# Patient Record
Sex: FEMALE | Race: WHITE | Employment: OTHER | URBAN - NONMETROPOLITAN AREA
[De-identification: names, ages, dates, MRNs, and addresses within clinical notes are randomized per-mention and may not be internally consistent; named-entity substitution may affect disease eponyms.]

---

## 2020-12-29 ENCOUNTER — OFFICE VISIT (OUTPATIENT)
Dept: FAMILY MEDICINE CLINIC | Age: 65
End: 2020-12-29
Payer: MEDICARE

## 2020-12-29 ENCOUNTER — TRANSCRIBE ORDER (OUTPATIENT)
Dept: SCHEDULING | Age: 65
End: 2020-12-29

## 2020-12-29 VITALS
BODY MASS INDEX: 23.19 KG/M2 | HEART RATE: 91 BPM | SYSTOLIC BLOOD PRESSURE: 117 MMHG | DIASTOLIC BLOOD PRESSURE: 75 MMHG | HEIGHT: 62 IN | OXYGEN SATURATION: 100 % | WEIGHT: 126 LBS | TEMPERATURE: 96.7 F

## 2020-12-29 DIAGNOSIS — Z00.00 PHYSICAL EXAM: Primary | ICD-10-CM

## 2020-12-29 DIAGNOSIS — Z12.31 VISIT FOR SCREENING MAMMOGRAM: Primary | ICD-10-CM

## 2020-12-29 DIAGNOSIS — N63.0 BREAST NODULE: ICD-10-CM

## 2020-12-29 DIAGNOSIS — R05.8 COUGH PRESENT FOR GREATER THAN 3 WEEKS: ICD-10-CM

## 2020-12-29 DIAGNOSIS — Z00.00 PHYSICAL EXAM: ICD-10-CM

## 2020-12-29 DIAGNOSIS — M81.0 AGE-RELATED OSTEOPOROSIS WITHOUT CURRENT PATHOLOGICAL FRACTURE: ICD-10-CM

## 2020-12-29 DIAGNOSIS — M79.7 FIBROMYALGIA: ICD-10-CM

## 2020-12-29 DIAGNOSIS — Z13.1 SCREENING FOR DIABETES MELLITUS (DM): ICD-10-CM

## 2020-12-29 DIAGNOSIS — Z77.22 SECOND HAND TOBACCO SMOKE EXPOSURE: ICD-10-CM

## 2020-12-29 PROCEDURE — 99213 OFFICE O/P EST LOW 20 MIN: CPT | Performed by: INTERNAL MEDICINE

## 2020-12-29 NOTE — PROGRESS NOTES
Yves Gant presents today for   Chief Complaint   Patient presents with   [address]       Is someone accompanying this pt? no    Is the patient using any DME equipment during OV? no    Depression Screening:  3 most recent PHQ Screens 12/29/2020   Little interest or pleasure in doing things Not at all   Feeling down, depressed, irritable, or hopeless Not at all   Total Score PHQ 2 0       Learning Assessment:  Learning Assessment 12/29/2020   PRIMARY LEARNER Patient   PRIMARY LANGUAGE ENGLISH   LEARNER PREFERENCE PRIMARY DEMONSTRATION   ANSWERED BY patient   RELATIONSHIP SELF       Abuse Screening:  No flowsheet data found. Fall Risk  No flowsheet data found. ADL  No flowsheet data found. Health Maintenance reviewed and discussed and ordered per Provider. Health Maintenance Due   Topic Date Due    Hepatitis C Screening  1955    DTaP/Tdap/Td series (1 - Tdap) 08/27/1978    PAP AKA CERVICAL CYTOLOGY  08/27/1978    Lipid Screen  08/27/1997    Shingrix Vaccine Age 50> (1 of 2) 08/27/2007    Colorectal Cancer Screening Combo  08/27/2007    Breast Cancer Screen Mammogram  08/27/2007    Flu Vaccine (1) 09/01/2020   . Coordination of Care:  1. Have you been to the ER, urgent care clinic since your last visit? Hospitalized since your last visit? no    2. Have you seen or consulted any other health care providers outside of the [address] [place] [address] since your last visit? Include any pap smears or colon screening.  no    Providers orders his own labs, orders for colonoscopy, mammograms and referrals as needed    Last UDS Checked no  Last Pain contract signed: no

## 2020-12-29 NOTE — PATIENT INSTRUCTIONS
Medicare Wellness Visit, Female The best way to live healthy is to have a lifestyle where you eat a well-balanced diet, exercise regularly, limit alcohol use, and quit all forms of tobacco/nicotine, if applicable. Regular preventive services are another way to keep healthy. Preventive services (vaccines, screening tests, monitoring & exams) can help personalize your care plan, which helps you manage your own care. Screening tests can find health problems at the earliest stages, when they are easiest to treat. [facility] follows the current, evidence-based guidelines published by the [facility] ([facility]) when recommending preventive services for our patients. Because we follow these guidelines, sometimes recommendations change over time as research supports it. (For example, mammograms used to be recommended annually. Even though Medicare will still pay for an annual mammogram, the newer guidelines recommend a mammogram every two years for women of average risk). Of course, you and your doctor may decide to screen more often for some diseases, based on your risk and your co-morbidities (chronic disease you are already diagnosed with). Preventive services for you include: - Medicare offers their members a free annual wellness visit, which is time for you and your primary care provider to discuss and plan for your preventive service needs. Take advantage of this benefit every year! 
-All adults over the age of 72 should receive the recommended pneumonia vaccines. Current USPSTF guidelines recommend a series of two vaccines for the best pneumonia protection.  
-All adults should have a flu vaccine yearly and a tetanus vaccine every 10 years.  
-All adults age 48 and older should receive the shingles vaccines (series of two vaccines). -All adults age 38-68 who are overweight should have a diabetes screening test once every three years. -All adults born between [birth year] and 1963 should be screened once for Hepatitis C. 
-Other screening tests and preventive services for persons with diabetes include: an eye exam to screen for diabetic retinopathy, a kidney function test, a foot exam, and stricter control over your cholesterol.  
-Cardiovascular screening for adults with routine risk involves an electrocardiogram (ECG) at intervals determined by your doctor.  
-Colorectal cancer screenings should be done for adults age 54-65 with no increased risk factors for colorectal cancer. There are a number of acceptable methods of screening for this type of cancer. Each test has its own benefits and drawbacks. Discuss with your doctor what is most appropriate for you during your annual wellness visit. The different tests include: colonoscopy (considered the best screening method), a fecal occult blood test, a fecal DNA test, and sigmoidoscopy. 
 
-A bone mass density test is recommended when a woman turns 65 to screen for osteoporosis. This test is only recommended one time, as a screening. Some providers will use this same test as a disease monitoring tool if you already have osteoporosis. -Breast cancer screenings are recommended every other year for women of normal risk, age 54-69. 
-Cervical cancer screenings for women over age 72 are only recommended with certain risk factors. Here is a list of your current Health Maintenance items (your personalized list of preventive services) with a due date: 
Health Maintenance Due Topic Date Due  
 Hepatitis C Test  1955  
 DTaP/Tdap/Td  (1 - Tdap) 08/27/1978  Pap Test  08/27/1978  Cholesterol Test   08/27/1997  Shingles Vaccine (1 of 2) 08/27/2007  Colorectal Screening  08/27/2007  Mammogram  08/27/2007  Yearly Flu Vaccine (1) 09/01/2020

## 2020-12-29 NOTE — PROGRESS NOTES
YOSEF Pack  is a 63 y.o. female and presents today for Establish Care  Doing well. Needs physical. Scheduled mammogram. Had  hysterectomy    Allergies    Allergies   Allergen Reactions    Amitriptyline Unknown (comments)    Gabapentin Vertigo    Penicillins Unknown (comments)        Medications    No current outpatient medications on file. No current facility-administered medications for this visit. Health Maintenance    Health Maintenance Due   Topic Date Due    Hepatitis C Screening  1955    DTaP/Tdap/Td series (1 - Tdap) 08/27/1978    PAP AKA CERVICAL CYTOLOGY  08/27/1978    Lipid Screen  08/27/1997    Shingrix Vaccine Age 50> (1 of 2) 08/27/2007    Colorectal Cancer Screening Combo  08/27/2007    Breast Cancer Screen Mammogram  08/27/2007    Flu Vaccine (1) 09/01/2020        Problem List    Patient Active Problem List    Diagnosis Date Noted    Physical exam 12/29/2020    Fibromyalgia 12/29/2020        Family Hx    Family History   Problem Relation Age of Onset   Grimm Cancer Mother     No Known Problems Father         Social Hx    Social History     Socioeconomic History    Marital status:      Spouse name: Not on file    Number of children: Not on file    Years of education: Not on file    Highest education level: Not on file   Tobacco Use    Smoking status: Never Smoker    Smokeless tobacco: Never Used   Substance and Sexual Activity    Alcohol use: Never     Frequency: Never    Drug use: Never        Surgical Hx    Past Surgical History:   Procedure Laterality Date    HX GYN      hysterectomy   in her 31's          Vitals    Visit Vitals  /75 (BP 1 Location: Left arm, BP Patient Position: Sitting)   Pulse 91   Temp (!) 96.7 °F (35.9 °C)   Ht 5' 2\" (1.575 m)   Wt 126 lb (57.2 kg)   SpO2 100%   BMI 23.05 kg/m²        ROS    Review of Systems   Constitutional: Negative. HENT: Negative. Eyes: Negative. Respiratory: Negative.     Cardiovascular: Negative. Gastrointestinal: Negative. Genitourinary: Negative. Musculoskeletal: Negative. Skin: Negative. Neurological: Negative. Endo/Heme/Allergies: Negative. Physical Exam    Physical Exam  Vitals signs and nursing note reviewed. Constitutional:       Appearance: Normal appearance. HENT:      Head: Normocephalic. Right Ear: Tympanic membrane normal.   Eyes:      Extraocular Movements: Extraocular movements intact. Pupils: Pupils are equal, round, and reactive to light. Neck:      Musculoskeletal: Normal range of motion and neck supple. Cardiovascular:      Rate and Rhythm: Normal rate. Pulses: Normal pulses. Pulmonary:      Effort: Pulmonary effort is normal.      Breath sounds: Normal breath sounds. Abdominal:      General: Abdomen is flat. Palpations: Abdomen is soft. Musculoskeletal: Normal range of motion. Neurological:      General: No focal deficit present. Mental Status: She is alert and oriented to person, place, and time. Psychiatric:         Mood and Affect: Mood normal.          Assessment/Plan    Diagnoses and all orders for this visit:    1. Physical exam    2. Fibromyalgia         Health Maintenance Items reviewed with patient as noted.

## 2020-12-30 ENCOUNTER — APPOINTMENT (OUTPATIENT)
Dept: CT IMAGING | Age: 65
End: 2020-12-30
Attending: INTERNAL MEDICINE
Payer: MEDICARE

## 2020-12-30 ENCOUNTER — HOSPITAL ENCOUNTER (OUTPATIENT)
Dept: LAB | Age: 65
Discharge: HOME | End: 2020-12-30
Admitting: INTERNAL MEDICINE
Payer: MEDICARE

## 2020-12-30 LAB
ALBUMIN SERPL-MCNC: 4 G/DL (ref 3.5–4.7)
ALBUMIN/GLOB SERPL: 1.4 {RATIO}
ALP SERPL-CCNC: 80 U/L (ref 38–126)
ALT SERPL-CCNC: 24 U/L (ref 3–52)
ANION GAP SERPL CALC-SCNC: 10 MMOL/L
APPEARANCE UR: CLEAR
AST SERPL W P-5'-P-CCNC: 22 U/L (ref 14–74)
BACTERIA URNS QL MICRO: ABNORMAL /HPF
BASOPHILS # BLD: 0.1 K/UL
BASOPHILS NFR BLD: 1 %
BILIRUB SERPL-MCNC: 0.8 MG/DL (ref 0.2–1)
BILIRUB UR QL: NEGATIVE
BUN SERPL-MCNC: 21 MG/DL (ref 9–21)
BUN/CREAT SERPL: 35
CA-I BLD-MCNC: 9.2 MG/DL (ref 8.5–10.5)
CHLORIDE SERPL-SCNC: 104 MMOL/L (ref 94–111)
CO2 SERPL-SCNC: 27 MMOL/L (ref 21–33)
COLOR UR: ABNORMAL
CREAT SERPL-MCNC: 0.6 MG/DL (ref 0.7–1.2)
DIFFERENTIAL METHOD BLD: ABNORMAL
EOSINOPHIL # BLD: 0.1 K/UL
EOSINOPHIL NFR BLD: 2 %
EPITH CASTS URNS QL MICRO: ABNORMAL /LPF (ref 0–20)
ERYTHROCYTE [DISTWIDTH] IN BLOOD BY AUTOMATED COUNT: 15 % (ref 11.6–14.5)
GLOBULIN SER CALC-MCNC: 2.9 G/DL
GLUCOSE SERPL-MCNC: 85 MG/DL (ref 70–110)
GLUCOSE UR STRIP.AUTO-MCNC: NEGATIVE MG/DL
HCT VFR BLD AUTO: 40.6 % (ref 35–45)
HGB BLD-MCNC: 13 G/DL (ref 12–16)
HGB UR QL STRIP: ABNORMAL
IMM GRANULOCYTES # BLD AUTO: 0 K/UL
IMM GRANULOCYTES NFR BLD AUTO: 0 %
KETONES UR QL STRIP.AUTO: NEGATIVE MG/DL
LEUKOCYTE ESTERASE UR QL STRIP.AUTO: NEGATIVE
LYMPHOCYTES # BLD: 1.8 K/UL
LYMPHOCYTES NFR BLD: 24 %
MCH RBC QN AUTO: 29.7 PG (ref 24–34)
MCHC RBC AUTO-ENTMCNC: 32.1 G/DL (ref 31–37)
MCV RBC AUTO: 92.4 FL (ref 74–97)
MONOCYTES # BLD: 0.5 K/UL
MONOCYTES NFR BLD: 7 %
MUCOUS THREADS URNS QL MICRO: ABNORMAL /LPF
NEUTS SEG # BLD: 4.8 K/UL
NEUTS SEG NFR BLD: 66 %
NITRITE UR QL STRIP.AUTO: NEGATIVE
PH UR STRIP: 6.5 [PH] (ref 5–9)
PLATELET # BLD AUTO: 432 K/UL (ref 135–420)
PMV BLD AUTO: 8 FL (ref 9.2–11.8)
POTASSIUM SERPL-SCNC: 3.9 MMOL/L (ref 3.2–5.1)
PROT SERPL-MCNC: 6.9 G/DL (ref 6.1–8.4)
PROT UR STRIP-MCNC: NEGATIVE MG/DL
RBC # BLD AUTO: 4.39 M/UL (ref 4.2–5.3)
RBC #/AREA URNS HPF: ABNORMAL /HPF (ref 0–2)
RBC MORPH BLD: ABNORMAL
SODIUM SERPL-SCNC: 141 MMOL/L (ref 135–145)
SP GR UR REFRACTOMETRY: 1.02 (ref 1–1.03)
UROBILINOGEN UR QL STRIP.AUTO: 0.2 EU/DL (ref 0.2–1)
WBC # BLD AUTO: 7.3 K/UL (ref 4.6–13.2)
WBC URNS QL MICRO: ABNORMAL /HPF (ref 0–4)

## 2020-12-30 PROCEDURE — 82043 UR ALBUMIN QUANTITATIVE: CPT

## 2020-12-30 PROCEDURE — 80053 COMPREHEN METABOLIC PANEL: CPT

## 2020-12-30 PROCEDURE — 36415 COLL VENOUS BLD VENIPUNCTURE: CPT

## 2020-12-30 PROCEDURE — 81001 URINALYSIS AUTO W/SCOPE: CPT

## 2020-12-30 PROCEDURE — 85025 COMPLETE CBC W/AUTO DIFF WBC: CPT

## 2020-12-31 ENCOUNTER — HOSPITAL ENCOUNTER (OUTPATIENT)
Dept: CT IMAGING | Age: 65
Discharge: HOME | End: 2020-12-31
Attending: INTERNAL MEDICINE
Payer: MEDICARE

## 2020-12-31 DIAGNOSIS — R05.8 COUGH PRESENT FOR GREATER THAN 3 WEEKS: ICD-10-CM

## 2020-12-31 DIAGNOSIS — Z77.22 SECOND HAND TOBACCO SMOKE EXPOSURE: ICD-10-CM

## 2020-12-31 LAB
CREAT UR-MCNC: 131 MG/DL
MICROALBUMIN UR-MCNC: 0.77 MG/DL
MICROALBUMIN/CREAT UR-RTO: 6 MGMALB/GCRE (ref 0–30)

## 2020-12-31 PROCEDURE — 71250 CT THORAX DX C-: CPT

## 2021-01-04 PROBLEM — N63.0 BREAST NODULE: Status: ACTIVE | Noted: 2021-01-04

## 2021-01-07 ENCOUNTER — HOSPITAL ENCOUNTER (OUTPATIENT)
Dept: MAMMOGRAPHY | Age: 66
Discharge: HOME | End: 2021-01-07
Attending: INTERNAL MEDICINE
Payer: MEDICARE

## 2021-01-07 ENCOUNTER — TELEPHONE (OUTPATIENT)
Dept: FAMILY MEDICINE CLINIC | Age: 66
End: 2021-01-07

## 2021-01-07 DIAGNOSIS — Z00.00 PHYSICAL EXAM: ICD-10-CM

## 2021-01-07 DIAGNOSIS — N63.0 BREAST NODULE: Primary | ICD-10-CM

## 2021-01-07 DIAGNOSIS — M81.0 AGE-RELATED OSTEOPOROSIS WITHOUT CURRENT PATHOLOGICAL FRACTURE: ICD-10-CM

## 2021-01-07 DIAGNOSIS — Z12.31 VISIT FOR SCREENING MAMMOGRAM: ICD-10-CM

## 2021-01-07 DIAGNOSIS — R93.89 NODULAR RADIOLOGIC DENSITY: ICD-10-CM

## 2021-01-07 PROCEDURE — 77066 DX MAMMO INCL CAD BI: CPT

## 2021-01-07 PROCEDURE — 77080 DXA BONE DENSITY AXIAL: CPT

## 2021-01-07 NOTE — TELEPHONE ENCOUNTER
Pt got a call from someone in the office about needing new diagnostic mammogram order and was calling to get more info about it.  Please give her a call at [phone]

## 2021-01-28 PROBLEM — Z00.00 PHYSICAL EXAM: Status: RESOLVED | Noted: 2020-12-29 | Resolved: 2021-01-28

## 2021-01-28 PROBLEM — Z13.1 SCREENING FOR DIABETES MELLITUS (DM): Status: RESOLVED | Noted: 2020-12-29 | Resolved: 2021-01-28

## 2021-03-16 ENCOUNTER — TELEPHONE (OUTPATIENT)
Dept: INTERNAL MEDICINE CLINIC | Age: 66
End: 2021-03-16

## 2022-01-09 ENCOUNTER — APPOINTMENT (OUTPATIENT)
Dept: CT IMAGING | Age: 67
End: 2022-01-09
Attending: EMERGENCY MEDICINE
Payer: MEDICARE

## 2022-01-09 ENCOUNTER — APPOINTMENT (OUTPATIENT)
Dept: GENERAL RADIOLOGY | Age: 67
End: 2022-01-09
Attending: EMERGENCY MEDICINE
Payer: MEDICARE

## 2022-01-09 ENCOUNTER — HOSPITAL ENCOUNTER (EMERGENCY)
Age: 67
Discharge: HOME | End: 2022-01-09
Attending: EMERGENCY MEDICINE
Payer: MEDICARE

## 2022-01-09 DIAGNOSIS — F43.21 GRIEF: ICD-10-CM

## 2022-01-09 DIAGNOSIS — R07.89 ATYPICAL CHEST PAIN: Primary | ICD-10-CM

## 2022-01-09 DIAGNOSIS — M25.512 LEFT SHOULDER PAIN, UNSPECIFIED CHRONICITY: ICD-10-CM

## 2022-01-09 LAB
ALBUMIN SERPL-MCNC: 4.4 G/DL (ref 3.5–4.7)
ALBUMIN/GLOB SERPL: 1.4 {RATIO}
ALP SERPL-CCNC: 73 U/L (ref 38–126)
ALT SERPL-CCNC: 17 U/L (ref 3–52)
ANION GAP SERPL CALC-SCNC: 13 MMOL/L
AST SERPL W P-5'-P-CCNC: 18 U/L (ref 14–74)
BASOPHILS # BLD: 0 K/UL (ref 0–0.1)
BASOPHILS NFR BLD: 0 % (ref 0–2)
BILIRUB SERPL-MCNC: 0.9 MG/DL (ref 0.2–1)
BUN SERPL-MCNC: 12 MG/DL (ref 9–21)
BUN/CREAT SERPL: 17
CA-I BLD-MCNC: 9.7 MG/DL (ref 8.5–10.5)
CHLORIDE SERPL-SCNC: 101 MMOL/L (ref 94–111)
CK MB SERPL-MCNC: 3 NG/ML (ref 5–25)
CK SERPL-CCNC: 63 U/L (ref 26–140)
CO2 SERPL-SCNC: 26 MMOL/L (ref 21–33)
CREAT SERPL-MCNC: 0.7 MG/DL (ref 0.7–1.2)
D DIMER PPP FEU-MCNC: <0.27 UG/ML(FEU)
DIFFERENTIAL METHOD BLD: ABNORMAL
EOSINOPHIL # BLD: 0.1 K/UL (ref 0–0.4)
EOSINOPHIL NFR BLD: 1 % (ref 0–5)
ERYTHROCYTE [DISTWIDTH] IN BLOOD BY AUTOMATED COUNT: 14.2 % (ref 11.6–14.5)
GLOBULIN SER CALC-MCNC: 3.1 G/DL
GLUCOSE SERPL-MCNC: 95 MG/DL (ref 70–110)
HCT VFR BLD AUTO: 41.6 % (ref 35–45)
HGB BLD-MCNC: 13.4 G/DL (ref 12–16)
IMM GRANULOCYTES # BLD AUTO: 0.1 K/UL (ref 0–0.04)
IMM GRANULOCYTES NFR BLD AUTO: 0 % (ref 0–0.5)
LYMPHOCYTES # BLD: 2.3 K/UL (ref 0.9–3.6)
LYMPHOCYTES NFR BLD: 18 % (ref 21–52)
MCH RBC QN AUTO: 29.8 PG (ref 24–34)
MCHC RBC AUTO-ENTMCNC: 32.2 G/DL (ref 31–37)
MCV RBC AUTO: 92.4 FL (ref 78–100)
MONOCYTES # BLD: 0.6 K/UL (ref 0.05–1.2)
MONOCYTES NFR BLD: 5 % (ref 3–10)
NEUTS SEG # BLD: 9.5 K/UL (ref 1.8–8)
NEUTS SEG NFR BLD: 76 % (ref 40–73)
NRBC # BLD: 0 K/UL (ref 0–0.01)
NRBC BLD-RTO: 0 PER 100 WBC
PLATELET # BLD AUTO: 428 K/UL (ref 135–420)
PMV BLD AUTO: 10 FL (ref 9.2–11.8)
POTASSIUM SERPL-SCNC: 3.4 MMOL/L (ref 3.2–5.1)
PROT SERPL-MCNC: 7.5 G/DL (ref 6.1–8.4)
RBC # BLD AUTO: 4.5 M/UL (ref 4.2–5.3)
SODIUM SERPL-SCNC: 140 MMOL/L (ref 135–145)
TROPONIN I SERPL-MCNC: <0.02 NG/ML (ref 0.02–0.05)
TROPONIN I SERPL-MCNC: <0.02 NG/ML (ref 0.02–0.05)
WBC # BLD AUTO: 12.6 K/UL (ref 4.6–13.2)

## 2022-01-09 PROCEDURE — 70450 CT HEAD/BRAIN W/O DYE: CPT

## 2022-01-09 PROCEDURE — 84484 ASSAY OF TROPONIN QUANT: CPT

## 2022-01-09 PROCEDURE — 74011250636 HC RX REV CODE- 250/636: Performed by: EMERGENCY MEDICINE

## 2022-01-09 PROCEDURE — 71045 X-RAY EXAM CHEST 1 VIEW: CPT

## 2022-01-09 PROCEDURE — 99284 EMERGENCY DEPT VISIT MOD MDM: CPT

## 2022-01-09 PROCEDURE — 82550 ASSAY OF CK (CPK): CPT

## 2022-01-09 PROCEDURE — 80053 COMPREHEN METABOLIC PANEL: CPT

## 2022-01-09 PROCEDURE — 72125 CT NECK SPINE W/O DYE: CPT

## 2022-01-09 PROCEDURE — 85379 FIBRIN DEGRADATION QUANT: CPT

## 2022-01-09 PROCEDURE — 96374 THER/PROPH/DIAG INJ IV PUSH: CPT

## 2022-01-09 PROCEDURE — 82553 CREATINE MB FRACTION: CPT

## 2022-01-09 PROCEDURE — 85025 COMPLETE CBC W/AUTO DIFF WBC: CPT

## 2022-01-09 PROCEDURE — 96375 TX/PRO/DX INJ NEW DRUG ADDON: CPT

## 2022-01-09 PROCEDURE — 93005 ELECTROCARDIOGRAM TRACING: CPT

## 2022-01-09 RX ORDER — KETOROLAC TROMETHAMINE 30 MG/ML
30 INJECTION, SOLUTION INTRAMUSCULAR; INTRAVENOUS
Status: COMPLETED | OUTPATIENT
Start: 2022-01-09 | End: 2022-01-09

## 2022-01-09 RX ORDER — LORAZEPAM 2 MG/ML
0.5 INJECTION INTRAMUSCULAR
Status: DISCONTINUED | OUTPATIENT
Start: 2022-01-09 | End: 2022-01-09

## 2022-01-09 RX ORDER — ONDANSETRON 2 MG/ML
4 INJECTION INTRAMUSCULAR; INTRAVENOUS
Status: COMPLETED | OUTPATIENT
Start: 2022-01-09 | End: 2022-01-09

## 2022-01-09 RX ORDER — ALPRAZOLAM 0.25 MG/1
0.25 TABLET ORAL
Qty: 8 TABLET | Refills: 0 | Status: SHIPPED | OUTPATIENT
Start: 2022-01-09

## 2022-01-09 RX ORDER — KETOROLAC TROMETHAMINE 10 MG/1
10 TABLET, FILM COATED ORAL
Qty: 15 TABLET | Refills: 0 | Status: SHIPPED | OUTPATIENT
Start: 2022-01-09

## 2022-01-09 RX ADMIN — ONDANSETRON 4 MG: 2 INJECTION INTRAMUSCULAR; INTRAVENOUS at 20:42

## 2022-01-09 RX ADMIN — KETOROLAC TROMETHAMINE 30 MG: 30 INJECTION, SOLUTION INTRAMUSCULAR at 20:44

## 2022-01-10 VITALS
HEIGHT: 62 IN | SYSTOLIC BLOOD PRESSURE: 150 MMHG | TEMPERATURE: 97.9 F | BODY MASS INDEX: 22.82 KG/M2 | RESPIRATION RATE: 18 BRPM | OXYGEN SATURATION: 98 % | DIASTOLIC BLOOD PRESSURE: 83 MMHG | WEIGHT: 124 LBS | HEART RATE: 66 BPM

## 2022-01-10 LAB
ATRIAL RATE: 75 BPM
CALCULATED P AXIS, ECG09: 88 DEGREES
CALCULATED R AXIS, ECG10: 10 DEGREES
CALCULATED T AXIS, ECG11: 57 DEGREES
DIAGNOSIS, 93000: NORMAL
P-R INTERVAL, ECG05: 191 MS
Q-T INTERVAL, ECG07: 380 MS
QRS DURATION, ECG06: 74 MS
QTC CALCULATION (BEZET), ECG08: 428 MS
VENTRICULAR RATE, ECG03: 76 BPM

## 2022-01-10 NOTE — ED NOTES
In to medicate patient as ordered, declined Ativan. Reports states it has opposite effect on her. Dr Krystal Maza aware.

## 2022-01-10 NOTE — ED NOTES
To treatment area with pain in chest and left arm and shoulder, states left arm feels heavy. Describes as pressure/heaviness in chest. Started approximately 2 hours PTA. Admits to being under a lot of stress recently due to illness and death of father.

## 2022-01-10 NOTE — ED PROVIDER NOTES
EMERGENCY DEPARTMENT HISTORY AND PHYSICAL EXAM      Date: 2022  Patient Name: Lalita Morris      History of Presenting Illness     Chief Complaint   Patient presents with    Chest Pain       History Provided By: Patient    HPI: Lalita Morris, 61 y.o. female with  No significant past medical history presents to the ED with cc of pain. Patient states about an hour prior to ED arrival, began having heaviness in the left. She states that she just buried her father yesterday and has been under tremendous stress. States she advised a friend of the symptoms she was having and was encouraged to come to the ED. The heaviness in the arm was radiating into her chest, she points to the upper sternal area for the pain. The pain has been constant. Worse with movement of the left. She rates it a 7 out of 10. She describes pain in the shoulder which radiates up to the neck as well. She denies any recent injury or heavy lifting. She has no cough and she is not short of breath. She thinks symptoms are related to stress, she states that her boyfriend shot himself last month, she had  for her father yesterday. She however has no risk factors for coronary artery disease, she denies diabetes, hypertension, hypercholesterolemia, family history. There are no other complaints, changes, or physical findings at this time. PCP: Elizabeth Roth MD    Current Outpatient Medications   Medication Sig Dispense Refill    ketorolac (TORADOL) 10 mg tablet Take 1 Tablet by mouth every eight (8) hours as needed for Pain. 15 Tablet 0    ALPRAZolam (Xanax) 0.25 mg tablet Take 1 Tablet by mouth every eight (8) hours as needed for Anxiety.  Max Daily Amount: 0.75 mg. 8 Tablet 0       Past History     Past Medical History:  Past Medical History:   Diagnosis Date    Menopause        Past Surgical History:  Past Surgical History:   Procedure Laterality Date    HX GYN      hysterectomy   in her 31's    HX HYSTERECTOMY partial, around age 30       Family History:  Family History   Problem Relation Age of Onset   Grimm Cancer Mother     Osteoporosis Mother     No Known Problems Father     Breast Cancer Maternal Aunt        Social History:  Social History     Tobacco Use    Smoking status: Never Smoker    Smokeless tobacco: Never Used   Vaping Use    Vaping Use: Never used   Substance Use Topics    Alcohol use: Never    Drug use: Never       Allergies: Allergies   Allergen Reactions    Amitriptyline Unknown (comments)     Makes me crazy    Penicillins Swelling    Gabapentin Vertigo         Review of Systems     Review of Systems   Constitutional: Negative for chills and fever. HENT: Negative for congestion and sore throat. Respiratory: Negative for cough and shortness of breath. Cardiovascular: Positive for chest pain. Gastrointestinal: Negative for abdominal distention, constipation, nausea and vomiting. Genitourinary: Negative for difficulty urinating, dysuria, flank pain, frequency, hematuria, urgency, vaginal bleeding and vaginal discharge. Musculoskeletal: Positive for arthralgias. Negative for joint swelling. Skin: Negative for rash and wound. Neurological: Negative for dizziness, weakness, light-headedness and headaches. Hematological: Negative for adenopathy. Physical Exam     Physical Exam  Vitals and nursing note reviewed. Constitutional:       General: She is not in acute distress. Appearance: She is well-developed. She is not diaphoretic. Comments: Very anxious female at arrival with labile affect and almost hyperventilating. HENT:      Head: Normocephalic and atraumatic. Jaw: No trismus. Right Ear: External ear normal. No swelling or tenderness. Tympanic membrane is not perforated, erythematous or bulging. Left Ear: External ear normal. No swelling or tenderness. Tympanic membrane is not perforated, erythematous or bulging.       Nose: Nose normal. No mucosal edema or rhinorrhea. Right Sinus: No maxillary sinus tenderness or frontal sinus tenderness. Left Sinus: No maxillary sinus tenderness or frontal sinus tenderness. Mouth/Throat:      Mouth: No oral lesions. Dentition: No dental abscesses. Pharynx: Uvula midline. No oropharyngeal exudate, posterior oropharyngeal erythema or uvula swelling. Tonsils: No tonsillar abscesses. Eyes:      General: No scleral icterus. Right eye: No discharge. Left eye: No discharge. Conjunctiva/sclera: Conjunctivae normal.   Cardiovascular:      Rate and Rhythm: Normal rate and regular rhythm. Heart sounds: Normal heart sounds. No murmur heard. No friction rub. No gallop. Pulmonary:      Effort: Pulmonary effort is normal. No tachypnea, accessory muscle usage or respiratory distress. Breath sounds: Normal breath sounds. No decreased breath sounds, wheezing, rhonchi or rales. Chest:      Chest wall: Tenderness present. Comments: She has reproducible pain to palpation of the sternum and upper left chest and worse anterior left shoulder. Left shoulder with limited range of motion due to the pain. The tenderness also extends to the left side of the neck and along very rotator cuff. Left arm is neurovascularly intact. Abdominal:      Palpations: Abdomen is soft. Musculoskeletal:         General: No tenderness. Normal range of motion. Cervical back: Normal range of motion and neck supple. Lymphadenopathy:      Cervical: No cervical adenopathy. Skin:     General: Skin is warm and dry. Findings: No erythema or rash. Neurological:      Mental Status: She is alert and oriented to person, place, and time.    Psychiatric:         Judgment: Judgment normal.         Lab and Diagnostic Study Results     Labs -     Recent Results (from the past 12 hour(s))   EKG, 12 LEAD, INITIAL    Collection Time: 01/09/22  8:14 PM   Result Value Ref Range    Ventricular Rate 76 BPM    Atrial Rate 75 BPM    P-R Interval 191 ms    QRS Duration 74 ms    Q-T Interval 380 ms    QTC Calculation (Bezet) 428 ms    Calculated P Axis 88 degrees    Calculated R Axis 10 degrees    Calculated T Axis 57 degrees    Diagnosis Sinus rhythm  Probable left atrial enlargement      CBC WITH AUTOMATED DIFF    Collection Time: 01/09/22  8:23 PM   Result Value Ref Range    WBC 12.6 4.6 - 13.2 K/uL    RBC 4.50 4. 20 - 5.30 M/uL    HGB 13.4 12.0 - 16.0 g/dL    HCT 41.6 35.0 - 45.0 %    MCV 92.4 78.0 - 100.0 FL    MCH 29.8 24.0 - 34.0 PG    MCHC 32.2 31.0 - 37.0 g/dL    RDW 14.2 11.6 - 14.5 %    PLATELET 769 (H) 984 - 420 K/uL    MPV 10.0 9.2 - 11.8 FL    NRBC 0.0 0.0  WBC    ABSOLUTE NRBC 0.00 0.00 - 0.01 K/uL    NEUTROPHILS 76 (H) 40 - 73 %    LYMPHOCYTES 18 (L) 21 - 52 %    MONOCYTES 5 3 - 10 %    EOSINOPHILS 1 0 - 5 %    BASOPHILS 0 0 - 2 %    IMMATURE GRANULOCYTES 0 0 - 0.5 %    ABS. NEUTROPHILS 9.5 (H) 1.8 - 8.0 K/UL    ABS. LYMPHOCYTES 2.3 0.9 - 3.6 K/UL    ABS. MONOCYTES 0.6 0.05 - 1.2 K/UL    ABS. EOSINOPHILS 0.1 0.0 - 0.4 K/UL    ABS. BASOPHILS 0.0 0.0 - 0.1 K/UL    ABS. IMM. GRANS. 0.1 (H) 0.00 - 0.04 K/UL    DF AUTOMATED     METABOLIC PANEL, COMPREHENSIVE    Collection Time: 01/09/22  8:23 PM   Result Value Ref Range    Sodium 140 135 - 145 mmol/L    Potassium 3.4 3.2 - 5.1 mmol/L    Chloride 101 94 - 111 mmol/L    CO2 26 21 - 33 mmol/L    Anion gap 13 mmol/L    Glucose 95 70 - 110 mg/dL    BUN 12 9 - 21 mg/dL    Creatinine 0.70 0.70 - 1.20 mg/dL    BUN/Creatinine ratio 17      GFR est AA >60 ml/min/1.73m2    GFR est non-AA >60 ml/min/1.73m2    Calcium 9.7 8.5 - 10.5 mg/dL    Bilirubin, total 0.9 0.2 - 1.0 mg/dL    AST (SGOT) 18 14 - 74 U/L    ALT (SGPT) 17 3 - 52 U/L    Alk.  phosphatase 73 38 - 126 U/L    Protein, total 7.5 6.1 - 8.4 g/dL    Albumin 4.4 3.5 - 4.7 g/dL    Globulin 3.1 g/dL    A-G Ratio 1.4     TROPONIN I    Collection Time: 01/09/22  8:23 PM   Result Value Ref Range Troponin-I, Qt. <0.02 (L) 0.02 - 0.05 ng/mL   CK    Collection Time: 01/09/22  8:23 PM   Result Value Ref Range    CK 63 26 - 140 U/L   CK-MB,QUANT. Collection Time: 01/09/22  8:23 PM   Result Value Ref Range    CK - MB 3.0 ng/mL   D DIMER    Collection Time: 01/09/22  8:23 PM   Result Value Ref Range    D DIMER <0.27 <0.46 ug/ml(FEU)   TROPONIN I    Collection Time: 01/09/22 10:27 PM   Result Value Ref Range    Troponin-I, Qt. <0.02 (L) 0.02 - 0.05 ng/mL       Radiologic Studies -   [unfilled]  CT Results  (Last 48 hours)               01/09/22 2106  CT HEAD WO CONT Final result    Impression:      No midline shift, mass effect or acute hemorrhage. 6 mm lacunar infarct in the right basal ganglia age-indeterminate. Narrative:  EXAM: CT head       INDICATION: Left arm weakness       COMPARISON: None. TECHNIQUE: Axial CT imaging of the head was performed without intravenous   contrast. One or more dose reduction techniques were used on this CT: automated   exposure control, adjustment of the mAs and/or kVp according to patient size,   and iterative reconstruction techniques. The specific techniques used on this   CT exam have been documented in the patient's electronic medical record. Digital   Imaging and Communications in Medicine (DICOM) format image data are available   to nonaffiliated external healthcare facilities or entities on a secure, media   free, reciprocally searchable basis with patient authorization for at least a   12-month period after this study. _______________       FINDINGS:       BRAIN AND POSTERIOR FOSSA: The sulci, folia, ventricles and basal cisterns are   within normal limits for the patient's age. There is no intracranial hemorrhage,   mass effect, or midline shift. There is a 6 mm lacunar infarct in the right   basal ganglia on image 15. Age indeterminate. EXTRA-AXIAL SPACES AND MENINGES: There are no abnormal extra-axial fluid   collections. CALVARIUM: Intact. SINUSES: Clear. OTHER: None.       _______________           01/09/22 2106  CT SPINE CERV WO CONT Final result    Impression:      No acute fractures or listhesis. Narrative:  EXAM: CT cervical spine       INDICATION: Left arm numbness       COMPARISON: None. TECHNIQUE: Axial CT imaging of the cervical spine was performed from the skull   base to the thoracic inlet without intravenous contrast. Multiplanar reformats   were generated. One or more dose reduction techniques were used on this CT:   automated exposure control, adjustment of the mAs and/or kVp according to   patient size, and iterative reconstruction techniques. The specific techniques   used on this CT exam have been documented in the patient's electronic medical   record. Digital Imaging and Communications in Medicine (DICOM) format image data   are available to nonaffiliated external healthcare facilities or entities on a   secure, media free, reciprocally searchable basis with patient authorization for   at least a 12-month period after this study. _______________       FINDINGS:       VERTEBRAE AND DISCS: Vertebral alignment and articulation are within normal   limits. Vertebral body and disc space heights are maintained. Specifically, no   compression deformities are noted and there is no spondylolisthesis. No   displaced fractures are identified. There are no significant areas of bone   lucency or sclerosis. SPINAL CANAL AND FORAMINA: Patent without significant bony canal or foramina   encroachment. PREVERTEBRAL SOFT TISSUES: Normal.       VISIBLE PORTIONS OF POSTERIOR FOSSA/BRAIN: Normal.       LUNG APICES: Clear. OTHER: None.       _______________               CXR Results  (Last 48 hours)               01/09/22 2112  XR CHEST PORT Final result    Impression:      No acute process. Narrative:  CLINICAL: Chest pain. COMPARISON: December 13, 2018.        A portable view of the chest from 2042 hours: The lungs and pleural spaces are clear. The mediastinum is unremarkable in   appearance. Medical Decision Making and ED Course   - I am the first and primary provider for this patient AND AM THE PRIMARY PROVIDER OF RECORD. - I reviewed the vital signs, available nursing notes, past medical history, past surgical history, family history and social history. - Initial assessment performed. The patients presenting problems have been discussed, and the staff are in agreement with the care plan formulated and outlined with them. I have encouraged them to ask questions as they arise throughout their visit. Vital Signs-Reviewed the patient's vital signs. Patient Vitals for the past 12 hrs:   Temp Pulse Resp BP SpO2   01/09/22 2345 97.9 °F (36.6 °C) 66 18 (!) 150/83 98 %   01/09/22 2230 -- 66 18 122/73 --   01/09/22 2100 -- 92 18 (!) 173/89 94 %   01/09/22 2022 98 °F (36.7 °C) 75 20 (!) 168/80 99 %       EKG interpretation: (Preliminary): Performed at 20:14, and read at 20:14  Rhythm: normal sinus rhythm; and regular . Rate (approx.): 76; Axis: normal; LA interval: normal; QRS interval: normal ; ST/T wave: normal; Other findings: .      Records Reviewed: Nursing Notes and Old Medical Records    The patient presents with chest pain with a differential diagnosis of  ACS, arrhythmia, acute MI, pulmonary edema/CHF, angina, aortic dissection, bronchitis, GERD, pericarditis, pnuemothorax, pnuemonia and PE, psych    ED Course:   Heart score is 0, PE RC is negative. Pain is reproducible with normal EKG. Pain is atypical most likely related to grief. She refused Ativan. Patient is stable for outpatient follow-up with cardiology and PCP. She was given precautions for returning to ED. Pain improved with Toradol.       Provider Notes (Medical Decision Making):         Consultations:       Consultations:         Procedures and Critical Care Disposition     Disposition: Condition stable and improved  DC- Adult Discharges: All of the diagnostic tests were reviewed and questions answered. Diagnosis, care plan and treatment options were discussed. The patient understands the instructions and will follow up as directed. The patients results have been reviewed with them. They have been counseled regarding their diagnosis. The patient verbally convey understanding and agreement of the signs, symptoms, diagnosis, treatment and prognosis and additionally agrees to follow up as recommended with their PCP in 24 - 48 hours. They also agree with the care-plan and convey that all of their questions have been answered. I have also put together some discharge instructions for them that include: 1) educational information regarding their diagnosis, 2) how to care for their diagnosis at home, as well a 3) list of reasons why they would want to return to the ED prior to their follow-up appointment, should their condition change. Diagnosis:   1. Atypical chest pain    2. Left shoulder pain, unspecified chronicity    3. Grief          Disposition:     Follow-up Information     Follow up With Specialties Details Why Contact Info    Amrik Galloway MD Internal Medicine In 1 day  [id number] [place] [address] [address] [facility] Cardiology  In 1 day  [address]  [phone]    [facility] EMERGENCY DEPT Emergency Medicine  If symptoms worsen [place] [id number]  [phone]          Discharge Medication List as of 1/9/2022 11:42 PM      START taking these medications    Details   ketorolac (TORADOL) 10 mg tablet Take 1 Tablet by mouth every eight (8) hours as needed for Pain., Normal, Disp-15 Tablet, R-0      ALPRAZolam (Xanax) 0.25 mg tablet Take 1 Tablet by mouth every eight (8) hours as needed for Anxiety.  Max Daily Amount: 0.75 mg., Normal, Disp-8 Tablet, R-0             Remove if not discharged  DISCHARGE PLAN:  1. Cannot display discharge medications since this patient is not currently admitted. 2.   Follow-up Information     Follow up With Specialties Details Why Contact Info    Kulwinder Mascorro MD Internal Medicine In 1 day  [address] [facility]  In 1 day  [address]  [address] [facility]  [phone]    [facility] EMERGENCY DEPT Emergency Medicine  If symptoms worsen [place] [id number]  [phone]        3. Return to ED if worse   4. Discharge Medication List as of 1/9/2022 11:42 PM      START taking these medications    Details   ketorolac (TORADOL) 10 mg tablet Take 1 Tablet by mouth every eight (8) hours as needed for Pain., Normal, Disp-15 Tablet, R-0      ALPRAZolam (Xanax) 0.25 mg tablet Take 1 Tablet by mouth every eight (8) hours as needed for Anxiety. Max Daily Amount: 0.75 mg., Normal, Disp-8 Tablet, R-0             Diagnosis     Clinical Impression:   1. Atypical chest pain    2. Left shoulder pain, unspecified chronicity    3. Grief        Attestations:    Cecilio Nayak MD    Please note that this dictation was completed with [employer], the computer voice recognition software. Quite often unanticipated grammatical, syntax, homophones, and other interpretive errors are inadvertently transcribed by the computer software. Please disregard these errors. Please excuse any errors that have escaped final proofreading. Thank you.

## 2022-01-10 NOTE — ED TRIAGE NOTES
Reports pain in chest and left arm began 2 hours PTA, describes as pressure. Pain is constant. Denies SOB, nausea/ vomiting/ diarrhea. States did bury father yesterday has been under a lot stress.

## 2022-01-10 NOTE — ED NOTES
In to draw repeat troponin, states pain is not necessarily better but does feel better. Does want to go home.

## 2022-03-19 PROBLEM — R05.8 COUGH PRESENT FOR GREATER THAN 3 WEEKS: Status: ACTIVE | Noted: 2020-12-29

## 2022-03-19 PROBLEM — Z77.22 SECOND HAND TOBACCO SMOKE EXPOSURE: Status: ACTIVE | Noted: 2020-12-29

## 2022-03-19 PROBLEM — M79.7 FIBROMYALGIA: Status: ACTIVE | Noted: 2020-12-29

## 2022-03-19 PROBLEM — N63.0 BREAST NODULE: Status: ACTIVE | Noted: 2021-01-04

## 2024-01-22 ENCOUNTER — HOSPITAL ENCOUNTER (OUTPATIENT)
Age: 69
Discharge: HOME | End: 2024-01-25
Payer: MEDICARE

## 2024-01-22 ENCOUNTER — TRANSCRIBE ORDERS (OUTPATIENT)
Age: 69
End: 2024-01-22

## 2024-01-22 DIAGNOSIS — J31.0 CHRONIC RHINITIS: ICD-10-CM

## 2024-01-22 DIAGNOSIS — J31.0 CHRONIC RHINITIS: Primary | ICD-10-CM

## 2024-01-22 PROCEDURE — 82785 ASSAY OF IGE: CPT

## 2024-01-22 PROCEDURE — 86003 ALLG SPEC IGE CRUDE XTRC EA: CPT

## 2024-01-22 PROCEDURE — 36415 COLL VENOUS BLD VENIPUNCTURE: CPT

## 2024-01-25 LAB
A ALTERNATA IGE QN: <0.1 KU/L
A FUMIGATUS IGE QN: <0.1 KU/L
BERMUDA GRASS IGE QN: <0.1 KU/L
BOXELDER IGE QN: <0.1 KU/L
C HERBARUM IGE QN: <0.1 KU/L
CAT DANDER IGG QN: <0.1 KU/L
COMMON RAGWEED IGE QN: <0.1 KU/L
COTTONWOOD IGE QN: <0.1 KU/L
D FARINAE IGE QN: <0.1 KU/L
D PTERONYSS IGE QN: <0.1 KU/L
DEPRECATED IGE QN: <0.1 KU/L
DOG DANDER IGE QN: <0.1 KU/L
IGE SERPL-ACNC: 50 IU/ML (ref 6–495)
JOHNSON GRASS IGE QN: <0.1 KU/L
Lab: NORMAL
MOUSE URINE PROT IGE QN: <0.1 KU/L
MT JUNIPER IGE QN: <0.1 KU/L
P NOTATUM IGE QN: <0.1 KU/L
PECAN/HICK TREE IGE QN: <0.1 KU/L
ROACH IGG QN: <0.1 KU/L
SHEEP SORREL IGE QN: <0.1 KU/L
TIMOTHY IGE QN: <0.1 KU/L
WHITE BIRCH IGE QN: <0.1 KU/L
WHITE ELM IGG QN: <0.1 KU/L
WHITE MULBERRY IGE QN: <0.1 KU/L
WHITE OAK IGE QN: <0.1 KU/L

## 2024-02-21 ENCOUNTER — HOSPITAL ENCOUNTER (OUTPATIENT)
Age: 69
Discharge: HOME | End: 2024-02-24
Payer: MEDICARE

## 2024-02-21 DIAGNOSIS — J20.9 ACUTE BRONCHITIS, UNSPECIFIED ORGANISM: ICD-10-CM

## 2024-02-21 PROCEDURE — 71046 X-RAY EXAM CHEST 2 VIEWS: CPT

## 2024-09-11 ENCOUNTER — HOSPITAL ENCOUNTER (OUTPATIENT)
Age: 69
Discharge: HOME | End: 2024-09-14
Payer: MEDICARE

## 2024-09-11 DIAGNOSIS — R06.02 SHORTNESS OF BREATH: ICD-10-CM

## 2024-09-11 PROCEDURE — 71046 X-RAY EXAM CHEST 2 VIEWS: CPT

## 2024-11-20 ENCOUNTER — HOSPITAL ENCOUNTER (OUTPATIENT)
Age: 69
Discharge: HOME | End: 2024-11-23
Payer: MEDICARE

## 2024-11-20 DIAGNOSIS — R13.14 DYSPHAGIA, PHARYNGOESOPHAGEAL PHASE: ICD-10-CM

## 2024-11-20 PROCEDURE — 2500000003 HC RX 250 WO HCPCS: Performed by: PHYSICIAN ASSISTANT

## 2024-11-20 PROCEDURE — 6370000000 HC RX 637 (ALT 250 FOR IP): Performed by: PHYSICIAN ASSISTANT

## 2024-11-20 PROCEDURE — 74220 X-RAY XM ESOPHAGUS 1CNTRST: CPT

## 2024-11-20 RX ADMIN — BARIUM SULFATE 1 TABLET: 700 TABLET ORAL at 10:15

## 2024-11-20 RX ADMIN — BARIUM SULFATE 178 ML: 0.6 SUSPENSION ORAL at 10:10

## 2024-11-20 RX ADMIN — BARIUM SULFATE 170 ML: 980 POWDER, FOR SUSPENSION ORAL at 10:05

## 2024-11-20 RX ADMIN — ANTACID/ANTIFLATULENT 1 EACH: 380; 550; 10; 10 GRANULE, EFFERVESCENT ORAL at 10:00

## 2024-12-03 ENCOUNTER — TRANSCRIBE ORDERS (OUTPATIENT)
Facility: HOSPITAL | Age: 69
End: 2024-12-03

## 2024-12-03 DIAGNOSIS — Z12.31 VISIT FOR SCREENING MAMMOGRAM: Primary | ICD-10-CM

## 2024-12-23 ENCOUNTER — HOSPITAL ENCOUNTER (OUTPATIENT)
Age: 69
Discharge: HOME | End: 2024-12-26
Payer: MEDICARE

## 2024-12-23 VITALS — WEIGHT: 124 LBS | BODY MASS INDEX: 22.82 KG/M2 | HEIGHT: 62 IN

## 2024-12-23 DIAGNOSIS — Z12.31 VISIT FOR SCREENING MAMMOGRAM: ICD-10-CM

## 2024-12-23 PROCEDURE — 77063 BREAST TOMOSYNTHESIS BI: CPT
